# Patient Record
Sex: MALE | URBAN - METROPOLITAN AREA
[De-identification: names, ages, dates, MRNs, and addresses within clinical notes are randomized per-mention and may not be internally consistent; named-entity substitution may affect disease eponyms.]

---

## 2022-12-08 ENCOUNTER — APPOINTMENT (RX ONLY)
Dept: URBAN - METROPOLITAN AREA CLINIC 151 | Facility: CLINIC | Age: 32
Setting detail: DERMATOLOGY
End: 2022-12-08

## 2022-12-08 DIAGNOSIS — L85.3 XEROSIS CUTIS: ICD-10-CM

## 2022-12-08 DIAGNOSIS — D17 BENIGN LIPOMATOUS NEOPLASM: ICD-10-CM

## 2022-12-08 PROBLEM — D17.21 BENIGN LIPOMATOUS NEOPLASM OF SKIN AND SUBCUTANEOUS TISSUE OF RIGHT ARM: Status: ACTIVE | Noted: 2022-12-08

## 2022-12-08 PROCEDURE — ? COUNSELING

## 2022-12-08 PROCEDURE — ? SEPARATE AND IDENTIFIABLE DOCUMENTATION

## 2022-12-08 PROCEDURE — ? PRESCRIPTION MEDICATION MANAGEMENT

## 2022-12-08 PROCEDURE — 99203 OFFICE O/P NEW LOW 30 MIN: CPT

## 2022-12-08 PROCEDURE — ? OBSERVATION AND MEASURE

## 2022-12-08 PROCEDURE — ? DIAGNOSIS COMMENT

## 2022-12-08 ASSESSMENT — LOCATION DETAILED DESCRIPTION DERM: LOCATION DETAILED: RIGHT DISTAL DORSAL FOREARM

## 2022-12-08 ASSESSMENT — LOCATION ZONE DERM: LOCATION ZONE: ARM

## 2022-12-08 ASSESSMENT — LOCATION SIMPLE DESCRIPTION DERM: LOCATION SIMPLE: RIGHT FOREARM

## 2022-12-08 NOTE — PROCEDURE: DIAGNOSIS COMMENT
Chief complaint:   No chief complaint on file.      Vitals:  Visit Vitals  Ht 5' 4.37\" (1.635 m)   Wt 48.1 kg (106 lb)   BMI 17.99 kg/m²       HISTORY OF PRESENT ILLNESS     HPI 11 y.o. male presents to urgent care with accompanied by mother and father with chief complaint of potentially infected bug bite to right foot that began 2 weeks ago.  States area was itchy and he was scratching it multiple times.  States he believes was due to mosquito bite, but didn't actually see mosquito bite it.  States there has been some drainage and erythematous appearing skin.  Denies any other complaints.    PPE used-gloves, N95 mask, face shield.    Other significant problems:  There are no problems to display for this patient.      PAST MEDICAL, FAMILY AND SOCIAL HISTORY     Medications:  No current outpatient medications on file.     No current facility-administered medications for this visit.       Allergies:  ALLERGIES:  No Known Allergies    Past Medical  History/Surgeries:  No past medical history on file.    No past surgical history on file.    Family History:  No family history on file.    Social History:  Social History     Tobacco Use   • Smoking status: Never Smoker   • Smokeless tobacco: Never Used   Substance Use Topics   • Alcohol use: Not on file       REVIEW OF SYSTEMS     Review of Systems   Constitutional: Negative.    Skin:        + cellulitis.       PHYSICAL EXAM     Physical Exam  Vitals and nursing note reviewed.   Constitutional:       General: He is active.   Skin:     General: Skin is warm and dry.      Comments: Right foot with 2 cm diameter area that is erythematous, warm to touch with central area of purulent drainage with no area of fluctuance.   Neurological:      General: No focal deficit present.      Mental Status: He is alert and oriented for age.   Psychiatric:         Mood and Affect: Mood normal.         Behavior: Behavior normal.         ASSESSMENT/PLAN     1. Fever, unspecified fever cause  
    Reassessed patient.  Patient mother defers further repeat of temperature, states she will check his temperature at home.  Instructed to go to ER if having new or worsening symptoms and to follow up with PCP in 2-3 days.  Discussed risks/benefits of medications to be given.  Patient displays good understanding of all topics discussed and is in agreeance with plan.    MDM-Patient presents to urgent care chief complaint of bug bite with patient having decreased HR, non tachycardic, non hypotensive, non hypoxic with cellulitis and otherwise reassuring physical exam.  Wound culture pending.  Will prescribe doxycycline.    Supervising physician for today shift is Dr. Donnell Marcum.    
Detail Level: Zone
Comment: Discussed nature and etiology. Lesion has enlarged in size and is painful with activity (wrestling). Recommended pt have excision done with either Dr. Evelyn Durham or Dr. Harshil Marrero. Pt would like to have procedure; plan to contact [facility] to make an appointment.
Render Risk Assessment In Note?: no

## 2022-12-08 NOTE — HPI: EVALUATION OF SKIN LESION(S)
Hpi Title: Evaluation of Skin Lesions
Additional History: Lipoma \\n-Dr. Carlos —> if it grows we treat\\n-pt reports pain when wrestling ()

## 2023-01-27 ENCOUNTER — APPOINTMENT (RX ONLY)
Dept: URBAN - METROPOLITAN AREA CLINIC 152 | Facility: CLINIC | Age: 33
Setting detail: DERMATOLOGY
End: 2023-01-27

## 2023-01-27 DIAGNOSIS — D17 BENIGN LIPOMATOUS NEOPLASM: ICD-10-CM

## 2023-01-27 PROBLEM — D17.21 BENIGN LIPOMATOUS NEOPLASM OF SKIN AND SUBCUTANEOUS TISSUE OF RIGHT ARM: Status: ACTIVE | Noted: 2023-01-27

## 2023-01-27 PROCEDURE — 25076 EXC FOREARM TUM DEEP < 3 CM: CPT

## 2023-01-27 PROCEDURE — ? SOFT TISSUE EXCISION

## 2023-01-27 ASSESSMENT — LOCATION SIMPLE DESCRIPTION DERM: LOCATION SIMPLE: RIGHT FOREARM

## 2023-01-27 ASSESSMENT — LOCATION DETAILED DESCRIPTION DERM: LOCATION DETAILED: RIGHT DISTAL DORSAL FOREARM

## 2023-01-27 ASSESSMENT — LOCATION ZONE DERM: LOCATION ZONE: ARM

## 2023-01-27 NOTE — PROCEDURE: SOFT TISSUE EXCISION
Disclaimer: Primary Closures are bundled in Soft Tissue Excision cpt codes. If you feel complex repairs, flaps or graft closures are warranted you will have to document them separately and must justify your reasoning for adding these closures. You assume the risk of audit by doing so.
Detail Level: Detailed
Insurance Zone (Required For Proper Billing): Forearm/Wrist
Size Of Lesion In Cm: 2.6
X Size Of Lesion In Cm (Optional): 2.1
Size Of Margin In Cm: 0.2
Excision Depth (Required For Proper Billing): submuscular
Excision Method: Elliptical
Repair Type: Intermediate
Intermediate / Complex Repair - Final Wound Length In Cm: 2.8
Suture Removal: 10 days
Anesthesia Volume In Cc: 6
Scalpel Size: 15 blade
Anesthesia Type: 1% lidocaine with epinephrine and a 1:10 solution of 8.4% sodium bicarbonate
Hemostasis: Electrocautery
Estimated Blood Loss (Cc): minimal
Lab: -103
Lab Facility: 0
Repair Anesthesia Method: local infiltration
Deep Sutures: 4-0 Vicryl
Epidermal Sutures: 4-0 Prolene
Epidermal Closure: running cuticular
Wound Care: Petrolatum
Dressing: pressure dressing with telfa
Path Notes (To The Dermatopathologist): Please check margins.
Render Path Notes In Note?: No
Medical Necessity Clause: This procedure was medically necessary because the lesion that was treated was:
Consent was obtained from the patient. The risks and benefits to therapy were discussed in detail. Specifically, the risks of infection, scarring, bleeding, prolonged wound healing, incomplete removal, allergy to anesthesia, nerve injury and recurrence were addressed. Prior to the procedure, the treatment site was clearly identified and confirmed by the patient. All components of Universal Protocol/PAUSE Rule completed.
Post-Care Instructions: I reviewed with the patient in detail post-care instructions. Patient is not to engage in any heavy lifting, exercise, or swimming for the next 14 days. Should the patient develop any fevers, chills, bleeding, severe pain patient will contact the office immediately.
Add Superficial Fascia When Documenting Dermal Sutures?: Yes
Billing Type: Third-Party Bill

## 2023-02-06 ENCOUNTER — APPOINTMENT (RX ONLY)
Dept: URBAN - METROPOLITAN AREA CLINIC 152 | Facility: CLINIC | Age: 33
Setting detail: DERMATOLOGY
End: 2023-02-06

## 2023-02-06 DIAGNOSIS — Z48.817 ENCOUNTER FOR SURGICAL AFTERCARE FOLLOWING SURGERY ON THE SKIN AND SUBCUTANEOUS TISSUE: ICD-10-CM

## 2023-02-06 PROCEDURE — 99024 POSTOP FOLLOW-UP VISIT: CPT

## 2023-02-06 PROCEDURE — ? SUTURE REMOVAL (GLOBAL PERIOD)

## 2023-02-06 ASSESSMENT — LOCATION DETAILED DESCRIPTION DERM: LOCATION DETAILED: RIGHT DISTAL DORSAL FOREARM

## 2023-02-06 ASSESSMENT — LOCATION SIMPLE DESCRIPTION DERM: LOCATION SIMPLE: RIGHT FOREARM

## 2023-02-06 ASSESSMENT — LOCATION ZONE DERM: LOCATION ZONE: ARM

## 2023-02-06 NOTE — PROCEDURE: SUTURE REMOVAL (GLOBAL PERIOD)
Detail Level: Detailed
Add 30599 Cpt? (Important Note: In 2017 The Use Of 89937 Is Being Tracked By Cms To Determine Future Global Period Reimbursement For Global Periods): yes